# Patient Record
Sex: FEMALE | Race: BLACK OR AFRICAN AMERICAN | NOT HISPANIC OR LATINO | Employment: OTHER | URBAN - METROPOLITAN AREA
[De-identification: names, ages, dates, MRNs, and addresses within clinical notes are randomized per-mention and may not be internally consistent; named-entity substitution may affect disease eponyms.]

---

## 2017-03-07 ENCOUNTER — OFFICE VISIT (OUTPATIENT)
Dept: INTERNAL MEDICINE | Facility: CLINIC | Age: 32
End: 2017-03-07
Payer: COMMERCIAL

## 2017-03-07 VITALS
SYSTOLIC BLOOD PRESSURE: 130 MMHG | DIASTOLIC BLOOD PRESSURE: 80 MMHG | TEMPERATURE: 99 F | BODY MASS INDEX: 26.89 KG/M2 | HEART RATE: 88 BPM | HEIGHT: 67 IN | WEIGHT: 171.31 LBS

## 2017-03-07 DIAGNOSIS — R53.83 FATIGUE, UNSPECIFIED TYPE: Primary | ICD-10-CM

## 2017-03-07 DIAGNOSIS — R63.1 POLYDIPSIA: ICD-10-CM

## 2017-03-07 DIAGNOSIS — G89.29 CHRONIC RIGHT SHOULDER PAIN: ICD-10-CM

## 2017-03-07 DIAGNOSIS — Z00.00 ROUTINE MEDICAL EXAM: ICD-10-CM

## 2017-03-07 DIAGNOSIS — R35.89 POLYURIA: ICD-10-CM

## 2017-03-07 DIAGNOSIS — M79.10 MYALGIA: ICD-10-CM

## 2017-03-07 DIAGNOSIS — F41.1 GAD (GENERALIZED ANXIETY DISORDER): ICD-10-CM

## 2017-03-07 DIAGNOSIS — M25.511 CHRONIC RIGHT SHOULDER PAIN: ICD-10-CM

## 2017-03-07 PROCEDURE — 99999 PR PBB SHADOW E&M-NEW PATIENT-LVL III: CPT | Mod: PBBFAC,,, | Performed by: FAMILY MEDICINE

## 2017-03-07 PROCEDURE — 99204 OFFICE O/P NEW MOD 45 MIN: CPT | Mod: S$GLB,,, | Performed by: FAMILY MEDICINE

## 2017-03-07 PROCEDURE — 1160F RVW MEDS BY RX/DR IN RCRD: CPT | Mod: S$GLB,,, | Performed by: FAMILY MEDICINE

## 2017-03-07 RX ORDER — ALPRAZOLAM 0.5 MG/1
0.5 TABLET ORAL 3 TIMES DAILY
Qty: 60 TABLET | Refills: 0 | Status: SHIPPED | OUTPATIENT
Start: 2017-03-07

## 2017-03-07 RX ORDER — FLUOXETINE 10 MG/1
10 CAPSULE ORAL DAILY
Qty: 30 CAPSULE | Refills: 5 | Status: SHIPPED | OUTPATIENT
Start: 2017-03-07 | End: 2018-03-07

## 2017-03-07 RX ORDER — ALPRAZOLAM 0.5 MG/1
0.5 TABLET ORAL 3 TIMES DAILY
COMMUNITY
End: 2017-03-07 | Stop reason: SDUPTHER

## 2017-03-08 NOTE — PROGRESS NOTES
Subjective:   Patient ID: Heladio Rondon is a 31 y.o. female.    Chief Complaint: No chief complaint on file.      Anxiety   Presents for initial visit. Onset was 1 to 5 years ago. The problem has been unchanged. Symptoms include decreased concentration, excessive worry, nervous/anxious behavior and panic. Patient reports no chest pain, compulsions, confusion, depressed mood, dizziness, dry mouth, feeling of choking, hyperventilation, impotence, insomnia, irritability, malaise, nausea, palpitations, restlessness, shortness of breath or suicidal ideas. Symptoms occur most days. The severity of symptoms is severe. The symptoms are aggravated by family issues and caffeine. The quality of sleep is fair. Nighttime awakenings: occasional.     There are no known risk factors. Her past medical history is significant for anxiety/panic attacks. There is no history of anemia, arrhythmia, asthma, bipolar disorder, CAD, CHF, chronic lung disease, depression, fibromyalgia, hyperthyroidism or suicide attempts. Past treatments include nothing. The treatment provided no relief.       Patient queried and denies any further complaints          PAST MEDICAL HISTORY:  History reviewed. No pertinent past medical history.    PAST SURGICAL HISTORY:  History reviewed. No pertinent surgical history.    SOCIAL HISTORY:  Social History     Social History    Marital status: Single     Spouse name: N/A    Number of children: N/A    Years of education: N/A     Occupational History    Not on file.     Social History Main Topics    Smoking status: Former Smoker     Types: Vaping w/o nicotine    Smokeless tobacco: Current User    Alcohol use Not on file    Drug use: Not on file    Sexual activity: Not on file     Other Topics Concern    Not on file     Social History Narrative       FAMILY HISTORY:  History reviewed. No pertinent family history.    ALLERGIES AND MEDICATIONS: updated and reviewed.  Review of patient's allergies  "indicates:  No Known Allergies    Current Outpatient Prescriptions:     alprazolam (XANAX) 0.5 MG tablet, Take 1 tablet (0.5 mg total) by mouth 3 (three) times daily., Disp: 60 tablet, Rfl: 0    fluoxetine (PROZAC) 10 MG capsule, Take 1 capsule (10 mg total) by mouth once daily., Disp: 30 capsule, Rfl: 5    Review of Systems   Constitutional: Positive for fatigue. Negative for fever and irritability.   HENT: Negative for sinus pressure.    Eyes: Negative for pain and redness.   Respiratory: Negative for shortness of breath.    Cardiovascular: Negative for chest pain, palpitations and leg swelling.   Gastrointestinal: Negative for abdominal pain, anal bleeding and nausea.   Endocrine: Negative for polydipsia and polyphagia.   Genitourinary: Negative for dyspareunia, flank pain and impotence.   Musculoskeletal: Negative for gait problem.   Skin: Negative for rash and wound.   Allergic/Immunologic: Negative for food allergies and immunocompromised state.   Neurological: Negative for dizziness and headaches.   Hematological: Negative for adenopathy. Does not bruise/bleed easily.   Psychiatric/Behavioral: Positive for decreased concentration. Negative for confusion and suicidal ideas. The patient is nervous/anxious. The patient does not have insomnia.        Objective:     Vitals:    03/07/17 1601   BP: 130/80   Pulse: 88   Temp: 99 °F (37.2 °C)   TempSrc: Oral   Weight: 77.7 kg (171 lb 4.8 oz)   Height: 5' 7" (1.702 m)   PainSc:   2     Body mass index is 26.83 kg/(m^2).    Physical Exam   Constitutional: She is oriented to person, place, and time. She appears well-developed and well-nourished. She is cooperative. She does not have a sickly appearance. No distress.   HENT:   Head: Normocephalic and atraumatic.   Right Ear: Hearing, tympanic membrane, external ear and ear canal normal. No tenderness.   Left Ear: Hearing, tympanic membrane, external ear and ear canal normal. No tenderness.   Nose: Nose normal. "   Mouth/Throat: Oropharynx is clear and moist. Normal dentition. No oropharyngeal exudate, posterior oropharyngeal edema or posterior oropharyngeal erythema.   Eyes: Conjunctivae and lids are normal. Right eye exhibits no discharge. Left eye exhibits no discharge. Right conjunctiva is not injected. Left conjunctiva is not injected. No scleral icterus. Right eye exhibits normal extraocular motion. Left eye exhibits normal extraocular motion.   Neck: Normal range of motion. Neck supple. No JVD present. Carotid bruit is not present. No tracheal deviation and no edema present. No thyromegaly present.   Cardiovascular: Normal rate, regular rhythm, normal heart sounds and normal pulses.  Exam reveals no friction rub.    No murmur heard.  Pulmonary/Chest: Effort normal and breath sounds normal. No accessory muscle usage. No respiratory distress. She has no wheezes. She has no rhonchi. She has no rales.   Musculoskeletal: She exhibits no edema.   Lymphadenopathy:        Head (right side): No submandibular adenopathy present.        Head (left side): No submandibular adenopathy present.     She has no cervical adenopathy.   Neurological: She is alert and oriented to person, place, and time.   Skin: Skin is warm and dry. She is not diaphoretic.   Psychiatric: Her speech is normal and behavior is normal. Thought content normal. Her mood appears not anxious. Her affect is not angry, not labile and not inappropriate. She does not exhibit a depressed mood.       Assessment and Plan:   Diagnoses and all orders for this visit:    Fatigue, unspecified type  -     CBC auto differential; Future  -     Comprehensive metabolic panel; Future  -     TSH; Future  -     T4, free; Future  -     Lipid panel; Future  -     Vitamin D; Future  -     RONY; Future  -     CYCLIC CITRUL PEPTIDE ANTIBODY, IGG; Future  -     ANTI-SMITH ANTIBODY; Future  -     ANTI-SSB ANTIBODY; Future  -     Rheumatoid factor; Future  -     Sedimentation rate, manual;  Future  -     C-reactive protein; Future  -     Hepatitis C antibody; Future  -     Hemoglobin A1c; Future  -     URINALYSIS; Future    Polyuria  -     CBC auto differential; Future  -     Comprehensive metabolic panel; Future  -     TSH; Future  -     T4, free; Future  -     Lipid panel; Future  -     Vitamin D; Future  -     RONY; Future  -     CYCLIC CITRUL PEPTIDE ANTIBODY, IGG; Future  -     ANTI-SMITH ANTIBODY; Future  -     ANTI-SSB ANTIBODY; Future  -     Rheumatoid factor; Future  -     Sedimentation rate, manual; Future  -     C-reactive protein; Future  -     Hepatitis C antibody; Future  -     Hemoglobin A1c; Future  -     URINALYSIS; Future    Polydipsia  -     CBC auto differential; Future  -     Comprehensive metabolic panel; Future  -     TSH; Future  -     T4, free; Future  -     Lipid panel; Future  -     Vitamin D; Future  -     RONY; Future  -     CYCLIC CITRUL PEPTIDE ANTIBODY, IGG; Future  -     ANTI-SMITH ANTIBODY; Future  -     ANTI-SSB ANTIBODY; Future  -     Rheumatoid factor; Future  -     Sedimentation rate, manual; Future  -     C-reactive protein; Future  -     Hepatitis C antibody; Future  -     Hemoglobin A1c; Future  -     URINALYSIS; Future    Routine medical exam  -     CBC auto differential; Future  -     Comprehensive metabolic panel; Future  -     TSH; Future  -     T4, free; Future  -     Lipid panel; Future  -     Vitamin D; Future  -     RONY; Future  -     CYCLIC CITRUL PEPTIDE ANTIBODY, IGG; Future  -     ANTI-SMITH ANTIBODY; Future  -     ANTI-SSB ANTIBODY; Future  -     Rheumatoid factor; Future  -     Sedimentation rate, manual; Future  -     C-reactive protein; Future  -     Hepatitis C antibody; Future  -     Hemoglobin A1c; Future  -     URINALYSIS; Future    HERMINIO (generalized anxiety disorder)  -     CBC auto differential; Future  -     Comprehensive metabolic panel; Future  -     TSH; Future  -     T4, free; Future  -     Lipid panel; Future  -     Vitamin D; Future  -      RONY; Future  -     CYCLIC CITRUL PEPTIDE ANTIBODY, IGG; Future  -     ANTI-SMITH ANTIBODY; Future  -     ANTI-SSB ANTIBODY; Future  -     Rheumatoid factor; Future  -     Sedimentation rate, manual; Future  -     C-reactive protein; Future  -     Hepatitis C antibody; Future  -     Hemoglobin A1c; Future  -     URINALYSIS; Future    Myalgia    Chronic right shoulder pain  -     Ambulatory consult to Physical Therapy    Other orders  -     alprazolam (XANAX) 0.5 MG tablet; Take 1 tablet (0.5 mg total) by mouth 3 (three) times daily.  -     fluoxetine (PROZAC) 10 MG capsule; Take 1 capsule (10 mg total) by mouth once daily.        Return in about 2 weeks (around 3/21/2017).    Pt has been given instructions populated from [facility] patient information database and has verbalized understanding of the after-visit summary (AVS) and information contained therein.    THIS NOTE WILL BE SHARED WITH THE PATIENT.

## 2017-03-09 ENCOUNTER — LAB VISIT (OUTPATIENT)
Dept: LAB | Facility: HOSPITAL | Age: 32
End: 2017-03-09
Attending: FAMILY MEDICINE
Payer: COMMERCIAL

## 2017-03-09 DIAGNOSIS — R63.1 POLYDIPSIA: ICD-10-CM

## 2017-03-09 DIAGNOSIS — R53.83 FATIGUE, UNSPECIFIED TYPE: ICD-10-CM

## 2017-03-09 DIAGNOSIS — F41.1 GAD (GENERALIZED ANXIETY DISORDER): ICD-10-CM

## 2017-03-09 DIAGNOSIS — Z00.00 ROUTINE MEDICAL EXAM: ICD-10-CM

## 2017-03-09 DIAGNOSIS — R35.89 POLYURIA: ICD-10-CM

## 2017-03-09 LAB
25(OH)D3+25(OH)D2 SERPL-MCNC: 13 NG/ML
ALBUMIN SERPL BCP-MCNC: 4.1 G/DL
ALP SERPL-CCNC: 61 U/L
ALT SERPL W/O P-5'-P-CCNC: 10 U/L
ANION GAP SERPL CALC-SCNC: 12 MMOL/L
AST SERPL-CCNC: 13 U/L
BASOPHILS # BLD AUTO: 0.01 K/UL
BASOPHILS NFR BLD: 0.2 %
BILIRUB SERPL-MCNC: 0.3 MG/DL
BUN SERPL-MCNC: 11 MG/DL
CALCIUM SERPL-MCNC: 9.3 MG/DL
CCP AB SER IA-ACNC: 0.5 U/ML
CHLORIDE SERPL-SCNC: 102 MMOL/L
CHOLEST/HDLC SERPL: 3.2 {RATIO}
CO2 SERPL-SCNC: 26 MMOL/L
CREAT SERPL-MCNC: 0.8 MG/DL
CRP SERPL-MCNC: 1.2 MG/L
DIFFERENTIAL METHOD: NORMAL
EOSINOPHIL # BLD AUTO: 0 K/UL
EOSINOPHIL NFR BLD: 0.9 %
ERYTHROCYTE [DISTWIDTH] IN BLOOD BY AUTOMATED COUNT: 13.7 %
ERYTHROCYTE [SEDIMENTATION RATE] IN BLOOD BY WESTERGREN METHOD: 8 MM/HR
EST. GFR  (AFRICAN AMERICAN): >60 ML/MIN/1.73 M^2
EST. GFR  (NON AFRICAN AMERICAN): >60 ML/MIN/1.73 M^2
GLUCOSE SERPL-MCNC: 104 MG/DL
HCT VFR BLD AUTO: 42.1 %
HCV AB SERPL QL IA: NEGATIVE
HDL/CHOLESTEROL RATIO: 31.2 %
HDLC SERPL-MCNC: 186 MG/DL
HDLC SERPL-MCNC: 58 MG/DL
HGB BLD-MCNC: 14.1 G/DL
LDLC SERPL CALC-MCNC: 112.2 MG/DL
LYMPHOCYTES # BLD AUTO: 2 K/UL
LYMPHOCYTES NFR BLD: 42.8 %
MCH RBC QN AUTO: 29.6 PG
MCHC RBC AUTO-ENTMCNC: 33.5 %
MCV RBC AUTO: 88 FL
MONOCYTES # BLD AUTO: 0.5 K/UL
MONOCYTES NFR BLD: 10 %
NEUTROPHILS # BLD AUTO: 2.1 K/UL
NEUTROPHILS NFR BLD: 45.9 %
NONHDLC SERPL-MCNC: 128 MG/DL
PLATELET # BLD AUTO: 336 K/UL
PMV BLD AUTO: 11.7 FL
POTASSIUM SERPL-SCNC: 4.2 MMOL/L
PROT SERPL-MCNC: 8.4 G/DL
RBC # BLD AUTO: 4.77 M/UL
RHEUMATOID FACT SERPL-ACNC: <10 IU/ML
SODIUM SERPL-SCNC: 140 MMOL/L
T4 FREE SERPL-MCNC: 0.86 NG/DL
TRIGL SERPL-MCNC: 79 MG/DL
TSH SERPL DL<=0.005 MIU/L-ACNC: 0.83 UIU/ML
WBC # BLD AUTO: 4.58 K/UL

## 2017-03-09 PROCEDURE — 86235 NUCLEAR ANTIGEN ANTIBODY: CPT | Mod: 59

## 2017-03-09 PROCEDURE — 85651 RBC SED RATE NONAUTOMATED: CPT

## 2017-03-09 PROCEDURE — 86803 HEPATITIS C AB TEST: CPT

## 2017-03-09 PROCEDURE — 80061 LIPID PANEL: CPT

## 2017-03-09 PROCEDURE — 36415 COLL VENOUS BLD VENIPUNCTURE: CPT | Mod: PO

## 2017-03-09 PROCEDURE — 80053 COMPREHEN METABOLIC PANEL: CPT

## 2017-03-09 PROCEDURE — 86200 CCP ANTIBODY: CPT

## 2017-03-09 PROCEDURE — 83036 HEMOGLOBIN GLYCOSYLATED A1C: CPT

## 2017-03-09 PROCEDURE — 84439 ASSAY OF FREE THYROXINE: CPT

## 2017-03-09 PROCEDURE — 86038 ANTINUCLEAR ANTIBODIES: CPT

## 2017-03-09 PROCEDURE — 84443 ASSAY THYROID STIM HORMONE: CPT

## 2017-03-09 PROCEDURE — 85025 COMPLETE CBC W/AUTO DIFF WBC: CPT

## 2017-03-09 PROCEDURE — 82306 VITAMIN D 25 HYDROXY: CPT

## 2017-03-09 PROCEDURE — 86431 RHEUMATOID FACTOR QUANT: CPT

## 2017-03-09 PROCEDURE — 86140 C-REACTIVE PROTEIN: CPT

## 2017-03-09 PROCEDURE — 86235 NUCLEAR ANTIGEN ANTIBODY: CPT

## 2017-03-10 ENCOUNTER — TELEPHONE (OUTPATIENT)
Dept: INTERNAL MEDICINE | Facility: CLINIC | Age: 32
End: 2017-03-10

## 2017-03-10 LAB
ANA SER QL IF: NORMAL
ANTI SM ANTIBODY: 1.37 EU
ANTI-SM INTERPRETATION: NEGATIVE
ANTI-SSB ANTIBODY: 1.48 EU
ANTI-SSB INTERPRETATION: NEGATIVE
ESTIMATED AVG GLUCOSE: 117 MG/DL
HBA1C MFR BLD HPLC: 5.7 %